# Patient Record
Sex: MALE | Race: OTHER | NOT HISPANIC OR LATINO | URBAN - METROPOLITAN AREA
[De-identification: names, ages, dates, MRNs, and addresses within clinical notes are randomized per-mention and may not be internally consistent; named-entity substitution may affect disease eponyms.]

---

## 2021-01-01 ENCOUNTER — INPATIENT (INPATIENT)
Facility: HOSPITAL | Age: 21
LOS: 2 days | Discharge: HOME | DRG: 896 | End: 2021-01-04
Attending: INTERNAL MEDICINE | Admitting: INTERNAL MEDICINE
Payer: SELF-PAY

## 2021-01-01 VITALS
DIASTOLIC BLOOD PRESSURE: 79 MMHG | RESPIRATION RATE: 18 BRPM | HEART RATE: 114 BPM | TEMPERATURE: 98 F | SYSTOLIC BLOOD PRESSURE: 126 MMHG

## 2021-01-01 DIAGNOSIS — R19.7 DIARRHEA, UNSPECIFIED: ICD-10-CM

## 2021-01-01 DIAGNOSIS — J69.0 PNEUMONITIS DUE TO INHALATION OF FOOD AND VOMIT: ICD-10-CM

## 2021-01-01 DIAGNOSIS — Z29.9 ENCOUNTER FOR PROPHYLACTIC MEASURES, UNSPECIFIED: ICD-10-CM

## 2021-01-01 DIAGNOSIS — F10.929 ALCOHOL USE, UNSPECIFIED WITH INTOXICATION, UNSPECIFIED: ICD-10-CM

## 2021-01-01 DIAGNOSIS — K92.0 HEMATEMESIS: ICD-10-CM

## 2021-01-01 DIAGNOSIS — R09.02 HYPOXEMIA: ICD-10-CM

## 2021-01-01 LAB
ACETONE SERPL-MCNC: NEGATIVE — SIGNIFICANT CHANGE UP
ALBUMIN SERPL ELPH-MCNC: 3.7 G/DL — SIGNIFICANT CHANGE UP (ref 3.5–5)
ALBUMIN SERPL ELPH-MCNC: 4.1 G/DL — SIGNIFICANT CHANGE UP (ref 3.5–5)
ALP SERPL-CCNC: 138 U/L — HIGH (ref 40–120)
ALP SERPL-CCNC: 167 U/L — HIGH (ref 40–120)
ALT FLD-CCNC: 37 U/L DA — SIGNIFICANT CHANGE UP (ref 10–60)
ALT FLD-CCNC: 42 U/L DA — SIGNIFICANT CHANGE UP (ref 10–60)
ANION GAP SERPL CALC-SCNC: 10 MMOL/L — SIGNIFICANT CHANGE UP (ref 5–17)
ANION GAP SERPL CALC-SCNC: 13 MMOL/L — SIGNIFICANT CHANGE UP (ref 5–17)
AST SERPL-CCNC: 23 U/L — SIGNIFICANT CHANGE UP (ref 10–40)
AST SERPL-CCNC: 29 U/L — SIGNIFICANT CHANGE UP (ref 10–40)
BASE EXCESS BLDA CALC-SCNC: -6.2 MMOL/L — LOW (ref -2–2)
BASOPHILS # BLD AUTO: 0.06 K/UL — SIGNIFICANT CHANGE UP (ref 0–0.2)
BASOPHILS NFR BLD AUTO: 0.4 % — SIGNIFICANT CHANGE UP (ref 0–2)
BILIRUB DIRECT SERPL-MCNC: 0.1 MG/DL — SIGNIFICANT CHANGE UP (ref 0–0.2)
BILIRUB INDIRECT FLD-MCNC: 0.2 MG/DL — SIGNIFICANT CHANGE UP (ref 0.2–1)
BILIRUB SERPL-MCNC: 0.2 MG/DL — SIGNIFICANT CHANGE UP (ref 0.2–1.2)
BILIRUB SERPL-MCNC: 0.3 MG/DL — SIGNIFICANT CHANGE UP (ref 0.2–1.2)
BLD GP AB SCN SERPL QL: SIGNIFICANT CHANGE UP
BLOOD GAS COMMENTS ARTERIAL: SIGNIFICANT CHANGE UP
BUN SERPL-MCNC: 7 MG/DL — SIGNIFICANT CHANGE UP (ref 7–18)
BUN SERPL-MCNC: 9 MG/DL — SIGNIFICANT CHANGE UP (ref 7–18)
CALCIUM SERPL-MCNC: 7.5 MG/DL — LOW (ref 8.4–10.5)
CALCIUM SERPL-MCNC: 7.7 MG/DL — LOW (ref 8.4–10.5)
CHLORIDE SERPL-SCNC: 113 MMOL/L — HIGH (ref 96–108)
CHLORIDE SERPL-SCNC: 114 MMOL/L — HIGH (ref 96–108)
CO2 SERPL-SCNC: 19 MMOL/L — LOW (ref 22–31)
CO2 SERPL-SCNC: 22 MMOL/L — SIGNIFICANT CHANGE UP (ref 22–31)
CREAT SERPL-MCNC: 0.64 MG/DL — SIGNIFICANT CHANGE UP (ref 0.5–1.3)
CREAT SERPL-MCNC: 0.77 MG/DL — SIGNIFICANT CHANGE UP (ref 0.5–1.3)
EOSINOPHIL # BLD AUTO: 0.1 K/UL — SIGNIFICANT CHANGE UP (ref 0–0.5)
EOSINOPHIL NFR BLD AUTO: 0.7 % — SIGNIFICANT CHANGE UP (ref 0–6)
ETHANOL SERPL-MCNC: 578 MG/DL — HIGH (ref 0–10)
GLUCOSE SERPL-MCNC: 118 MG/DL — HIGH (ref 70–99)
GLUCOSE SERPL-MCNC: 131 MG/DL — HIGH (ref 70–99)
HCO3 BLDA-SCNC: 21 MMOL/L — LOW (ref 23–27)
HCT VFR BLD CALC: 42.1 % — SIGNIFICANT CHANGE UP (ref 39–50)
HCT VFR BLD CALC: 48.2 % — SIGNIFICANT CHANGE UP (ref 39–50)
HGB BLD-MCNC: 14 G/DL — SIGNIFICANT CHANGE UP (ref 13–17)
HGB BLD-MCNC: 15.7 G/DL — SIGNIFICANT CHANGE UP (ref 13–17)
HOROWITZ INDEX BLDA+IHG-RTO: 21 — SIGNIFICANT CHANGE UP
IMM GRANULOCYTES NFR BLD AUTO: 0.7 % — SIGNIFICANT CHANGE UP (ref 0–1.5)
INR BLD: 1.04 RATIO — SIGNIFICANT CHANGE UP (ref 0.88–1.16)
LACTATE SERPL-SCNC: 1.7 MMOL/L — SIGNIFICANT CHANGE UP (ref 0.7–2)
LACTATE SERPL-SCNC: 2.6 MMOL/L — HIGH (ref 0.7–2)
LIDOCAIN IGE QN: 175 U/L — SIGNIFICANT CHANGE UP (ref 73–393)
LYMPHOCYTES # BLD AUTO: 1.87 K/UL — SIGNIFICANT CHANGE UP (ref 1–3.3)
LYMPHOCYTES # BLD AUTO: 13.7 % — SIGNIFICANT CHANGE UP (ref 13–44)
MAGNESIUM SERPL-MCNC: 1.9 MG/DL — SIGNIFICANT CHANGE UP (ref 1.6–2.6)
MAGNESIUM SERPL-MCNC: 2.2 MG/DL — SIGNIFICANT CHANGE UP (ref 1.6–2.6)
MCHC RBC-ENTMCNC: 29.2 PG — SIGNIFICANT CHANGE UP (ref 27–34)
MCHC RBC-ENTMCNC: 29.3 PG — SIGNIFICANT CHANGE UP (ref 27–34)
MCHC RBC-ENTMCNC: 32.6 GM/DL — SIGNIFICANT CHANGE UP (ref 32–36)
MCHC RBC-ENTMCNC: 33.3 GM/DL — SIGNIFICANT CHANGE UP (ref 32–36)
MCV RBC AUTO: 88.1 FL — SIGNIFICANT CHANGE UP (ref 80–100)
MCV RBC AUTO: 89.8 FL — SIGNIFICANT CHANGE UP (ref 80–100)
MONOCYTES # BLD AUTO: 0.25 K/UL — SIGNIFICANT CHANGE UP (ref 0–0.9)
MONOCYTES NFR BLD AUTO: 1.8 % — LOW (ref 2–14)
NEUTROPHILS # BLD AUTO: 11.29 K/UL — HIGH (ref 1.8–7.4)
NEUTROPHILS NFR BLD AUTO: 82.7 % — HIGH (ref 43–77)
NRBC # BLD: 0 /100 WBCS — SIGNIFICANT CHANGE UP (ref 0–0)
NRBC # BLD: 0 /100 WBCS — SIGNIFICANT CHANGE UP (ref 0–0)
OB PNL STL: NEGATIVE — SIGNIFICANT CHANGE UP
PCO2 BLDA: 52 MMHG — HIGH (ref 32–46)
PH BLDA: 7.24 — LOW (ref 7.35–7.45)
PHOSPHATE SERPL-MCNC: 3.2 MG/DL — SIGNIFICANT CHANGE UP (ref 2.5–4.5)
PLATELET # BLD AUTO: 296 K/UL — SIGNIFICANT CHANGE UP (ref 150–400)
PLATELET # BLD AUTO: 344 K/UL — SIGNIFICANT CHANGE UP (ref 150–400)
PO2 BLDA: 90 MMHG — SIGNIFICANT CHANGE UP (ref 74–108)
POTASSIUM SERPL-MCNC: 3.7 MMOL/L — SIGNIFICANT CHANGE UP (ref 3.5–5.3)
POTASSIUM SERPL-MCNC: 3.9 MMOL/L — SIGNIFICANT CHANGE UP (ref 3.5–5.3)
POTASSIUM SERPL-SCNC: 3.7 MMOL/L — SIGNIFICANT CHANGE UP (ref 3.5–5.3)
POTASSIUM SERPL-SCNC: 3.9 MMOL/L — SIGNIFICANT CHANGE UP (ref 3.5–5.3)
PROT SERPL-MCNC: 7.8 G/DL — SIGNIFICANT CHANGE UP (ref 6–8.3)
PROT SERPL-MCNC: 8.7 G/DL — HIGH (ref 6–8.3)
PROTHROM AB SERPL-ACNC: 12.3 SEC — SIGNIFICANT CHANGE UP (ref 10.6–13.6)
RBC # BLD: 4.78 M/UL — SIGNIFICANT CHANGE UP (ref 4.2–5.8)
RBC # BLD: 5.37 M/UL — SIGNIFICANT CHANGE UP (ref 4.2–5.8)
RBC # FLD: 12.8 % — SIGNIFICANT CHANGE UP (ref 10.3–14.5)
RBC # FLD: 12.9 % — SIGNIFICANT CHANGE UP (ref 10.3–14.5)
SAO2 % BLDA: 94 % — SIGNIFICANT CHANGE UP (ref 92–96)
SARS-COV-2 RNA SPEC QL NAA+PROBE: SIGNIFICANT CHANGE UP
SODIUM SERPL-SCNC: 145 MMOL/L — SIGNIFICANT CHANGE UP (ref 135–145)
SODIUM SERPL-SCNC: 146 MMOL/L — HIGH (ref 135–145)
WBC # BLD: 13.67 K/UL — HIGH (ref 3.8–10.5)
WBC # BLD: 16.28 K/UL — HIGH (ref 3.8–10.5)
WBC # FLD AUTO: 13.67 K/UL — HIGH (ref 3.8–10.5)
WBC # FLD AUTO: 16.28 K/UL — HIGH (ref 3.8–10.5)

## 2021-01-01 PROCEDURE — 74174 CTA ABD&PLVS W/CONTRAST: CPT | Mod: 26

## 2021-01-01 PROCEDURE — 70450 CT HEAD/BRAIN W/O DYE: CPT | Mod: 26

## 2021-01-01 PROCEDURE — 71045 X-RAY EXAM CHEST 1 VIEW: CPT | Mod: 26

## 2021-01-01 PROCEDURE — 99291 CRITICAL CARE FIRST HOUR: CPT

## 2021-01-01 RX ORDER — ONDANSETRON 8 MG/1
4 TABLET, FILM COATED ORAL ONCE
Refills: 0 | Status: COMPLETED | OUTPATIENT
Start: 2021-01-01 | End: 2021-01-01

## 2021-01-01 RX ORDER — CALCIUM GLUCONATE 100 MG/ML
1 VIAL (ML) INTRAVENOUS ONCE
Refills: 0 | Status: COMPLETED | OUTPATIENT
Start: 2021-01-01 | End: 2021-01-01

## 2021-01-01 RX ORDER — OCTREOTIDE ACETATE 200 UG/ML
50 INJECTION, SOLUTION INTRAVENOUS; SUBCUTANEOUS ONCE
Refills: 0 | Status: DISCONTINUED | OUTPATIENT
Start: 2021-01-01 | End: 2021-01-01

## 2021-01-01 RX ORDER — SODIUM CHLORIDE 9 MG/ML
1000 INJECTION, SOLUTION INTRAVENOUS ONCE
Refills: 0 | Status: COMPLETED | OUTPATIENT
Start: 2021-01-01 | End: 2021-01-01

## 2021-01-01 RX ORDER — PANTOPRAZOLE SODIUM 20 MG/1
40 TABLET, DELAYED RELEASE ORAL DAILY
Refills: 0 | Status: DISCONTINUED | OUTPATIENT
Start: 2021-01-01 | End: 2021-01-04

## 2021-01-01 RX ORDER — SODIUM CHLORIDE 9 MG/ML
1000 INJECTION INTRAMUSCULAR; INTRAVENOUS; SUBCUTANEOUS ONCE
Refills: 0 | Status: COMPLETED | OUTPATIENT
Start: 2021-01-01 | End: 2021-01-01

## 2021-01-01 RX ORDER — THIAMINE MONONITRATE (VIT B1) 100 MG
500 TABLET ORAL EVERY 8 HOURS
Refills: 0 | Status: DISCONTINUED | OUTPATIENT
Start: 2021-01-01 | End: 2021-01-04

## 2021-01-01 RX ORDER — SODIUM CHLORIDE 9 MG/ML
2400 INJECTION INTRAMUSCULAR; INTRAVENOUS; SUBCUTANEOUS ONCE
Refills: 0 | Status: COMPLETED | OUTPATIENT
Start: 2021-01-01 | End: 2021-01-01

## 2021-01-01 RX ORDER — ACETAMINOPHEN 500 MG
650 TABLET ORAL ONCE
Refills: 0 | Status: DISCONTINUED | OUTPATIENT
Start: 2021-01-01 | End: 2021-01-01

## 2021-01-01 RX ORDER — SODIUM CHLORIDE 9 MG/ML
1000 INJECTION INTRAMUSCULAR; INTRAVENOUS; SUBCUTANEOUS
Refills: 0 | Status: DISCONTINUED | OUTPATIENT
Start: 2021-01-01 | End: 2021-01-02

## 2021-01-01 RX ORDER — PIPERACILLIN AND TAZOBACTAM 4; .5 G/20ML; G/20ML
3.38 INJECTION, POWDER, LYOPHILIZED, FOR SOLUTION INTRAVENOUS ONCE
Refills: 0 | Status: COMPLETED | OUTPATIENT
Start: 2021-01-01 | End: 2021-01-01

## 2021-01-01 RX ORDER — SODIUM CHLORIDE 9 MG/ML
1000 INJECTION, SOLUTION INTRAVENOUS
Refills: 0 | Status: COMPLETED | OUTPATIENT
Start: 2021-01-01 | End: 2021-01-01

## 2021-01-01 RX ORDER — OCTREOTIDE ACETATE 200 UG/ML
50 INJECTION, SOLUTION INTRAVENOUS; SUBCUTANEOUS
Qty: 500 | Refills: 0 | Status: DISCONTINUED | OUTPATIENT
Start: 2021-01-01 | End: 2021-01-04

## 2021-01-01 RX ORDER — KETOROLAC TROMETHAMINE 30 MG/ML
30 SYRINGE (ML) INJECTION ONCE
Refills: 0 | Status: DISCONTINUED | OUTPATIENT
Start: 2021-01-01 | End: 2021-01-01

## 2021-01-01 RX ADMIN — Medication 100 GRAM(S): at 16:07

## 2021-01-01 RX ADMIN — ONDANSETRON 4 MILLIGRAM(S): 8 TABLET, FILM COATED ORAL at 16:06

## 2021-01-01 RX ADMIN — SODIUM CHLORIDE 1000 MILLILITER(S): 9 INJECTION INTRAMUSCULAR; INTRAVENOUS; SUBCUTANEOUS at 12:27

## 2021-01-01 RX ADMIN — PIPERACILLIN AND TAZOBACTAM 200 GRAM(S): 4; .5 INJECTION, POWDER, LYOPHILIZED, FOR SOLUTION INTRAVENOUS at 15:35

## 2021-01-01 RX ADMIN — ONDANSETRON 4 MILLIGRAM(S): 8 TABLET, FILM COATED ORAL at 13:52

## 2021-01-01 RX ADMIN — Medication 100 GRAM(S): at 18:46

## 2021-01-01 RX ADMIN — Medication 2 MILLIGRAM(S): at 18:53

## 2021-01-01 RX ADMIN — SODIUM CHLORIDE 100 MILLILITER(S): 9 INJECTION, SOLUTION INTRAVENOUS at 20:06

## 2021-01-01 RX ADMIN — Medication 2 MILLIGRAM(S): at 20:14

## 2021-01-01 RX ADMIN — SODIUM CHLORIDE 2000 MILLILITER(S): 9 INJECTION, SOLUTION INTRAVENOUS at 18:30

## 2021-01-01 RX ADMIN — SODIUM CHLORIDE 200 MILLILITER(S): 9 INJECTION INTRAMUSCULAR; INTRAVENOUS; SUBCUTANEOUS at 13:52

## 2021-01-01 RX ADMIN — Medication 105 MILLIGRAM(S): at 20:06

## 2021-01-01 RX ADMIN — SODIUM CHLORIDE 4800 MILLILITER(S): 9 INJECTION INTRAMUSCULAR; INTRAVENOUS; SUBCUTANEOUS at 15:31

## 2021-01-01 NOTE — H&P ADULT - HISTORY OF PRESENT ILLNESS
Young male with unknown past medical history, unknown age, unknown name was BIB EMS due to AMS. He was found unresponsive outside the building with vomitus all over. Patient was unresponsive when he was bought in to ED.     ED course:  He later was awake and had 4 episodes of coffee ground emesis in ED. His blood alcohol level was >500. He gave minimal history and said his name was Yayo King who lives with aunt and is 21 yrs old. His his history was changing and is not reliable at present.   In ED he had stool incontinence with foul smelling diarrhea. He is not giving any history of his symptoms or events that led him to this condition.

## 2021-01-01 NOTE — ED ADULT TRIAGE NOTE - CHIEF COMPLAINT QUOTE
Found covered in vomit unresponsive outside of a apartment building. Found covered in vomit unresponsive outside of an apartment building.

## 2021-01-01 NOTE — ED PROVIDER NOTE - CONSTITUTIONAL DEVELOPMENT, MLM
Internal medicine pREOPERATIVE history and physical     REQUESTING Physician    Kenny Lemus MD    PRIMARY CARE Physician    Stevenson Deleon MD    HISTORY OF PRESENT ILLNESS    This patient was seen for evaluation of medical conditions prior to upcoming surgery.  The patient is scheduled for surgery on March 12, 2018 at which time he will undergo a wide excision of a right temple lesion and reconstruction. He's had lesion for quite a while and has been getting bigger and evaluation has diagnosed as a squamous cell carcinoma. The patient feels it is a little bit irritated but denies any severe pain or any drainage.    PROBLEM LIST    Patient Active Problem List   Diagnosis   • Unspecified asthma(493.90)   • BPH (benign prostatic hypertrophy)   • Depression   • Type II diabetes mellitus, uncontrolled (CMS/HCC)   • Other and unspecified hyperlipidemia   • Mantle cell lymphoma (CMS/HCC)   • Anemia, unspecified   • Phlebitis and thrombophlebitis of other deep vessels of lower extremities       MEDICAL HISTORY    Past Medical History:   Diagnosis Date   • Anemia    • Asthma     seasonal   • Bacteremia 3/13/2013   • BPH (benign prostatic hypertrophy)    • Cellulitis of groin 3/12/2013   • Depression    • Hyperglycemia    • Kidney stones    • Lymphoma (CMS/HCC)     mantle cell lymphoma   • Other and unspecified hyperlipidemia    • Phlebitis and thrombophlebitis of other deep vessels of lower extremity, bilateral (CMS/HCC)    • Right ureteral stone 08/2014   • Thrombocytopenia, unspecified (CMS/HCC) 03/15/2013    R/T Chemo for mantle cell lymphoma (?)   • Type II or unspecified type diabetes mellitus without mention of complication, not stated as uncontrolled        FAMILY HISTORY    Family History   Problem Relation Age of Onset   • Diabetes Mother    • Diabetes Sister    • Hypertension Sister        SOCIAL HISTORY    Social History     Social History   • Marital status: Single     Spouse name: N/A   • Number of  children: 0   • Years of education: N/A     Occupational History   • / [employer]     Social History Main Topics   • Smoking status: Never Smoker   • Smokeless tobacco: Never Used   • Alcohol use No   • Drug use: No   • Sexual activity: No     Other Topics Concern   • None     Social History Narrative   • None       SURGICAL HISTORY    Past Surgical History:   Procedure Laterality Date   • Bone marrow biopsy  01/05/2013   • Cystoscopy w/ laser lithotripsy  08/05/2005   • Cystoscopy w/ ureteral stent placement  2014   • Cystoscopy,ureteroscopy,stone remv Right 08/2014   • Eye surgery Bilateral 2016   • Portacath placement  01/16/2013   • Removal gallbladder         CURRENT MEDICATIONS    Current Outpatient Prescriptions   Medication Sig Dispense Refill   • allopurinol (ZYLOPRIM) 100 MG tablet Take 1 tablet by mouth daily. 30 tablet 3   • acetaminophen (TYLENOL) 325 MG tablet Take 650 mg by mouth every 4 hours as needed for Pain.     • prochlorperazine (COMPAZINE) 10 MG tablet Take 1 tablet by mouth every 6 hours as needed for Nausea or Vomiting. 30 tablet 6   • acyclovir (ZOVIRAX) 400 MG tablet Take 1 tablet by mouth 2 times daily. 60 tablet 5   • sulfamethoxazole-trimethoprim (BACTRIM SS) 400-80 MG per tablet Take 1 tablet by mouth daily. 30 tablet 5   • lisinopril (ZESTRIL) 5 MG tablet Take 1 tablet by mouth daily. 90 tablet 1   • glipiZIDE (GLUCOTROL) 10 MG tablet Take 1 tablet by mouth 2 times daily (before meals). 180 tablet 1   • tamsulosin (FLOMAX) 0.4 MG Cap Take 1 capsule by mouth daily after a meal. 90 capsule 1   • pravastatin (PRAVACHOL) 10 MG tablet Take 1 tablet by mouth daily. 90 tablet 1   • pregabalin (LYRICA) 50 MG capsule Take 1 capsule by mouth 2 times daily. 60 capsule 5   • ketoconazole (NIZORAL) 2 % cream APPLY TWICE DAILY TO AFFECTED AREA ON SCALP 30 g 0   • furosemide (LASIX) 20 MG tablet Take 2 tablets by mouth daily. 60 tablet 0   • aspirin 81 MG tablet Take 81  mg by mouth daily.       No current facility-administered medications for this visit.        ALLERGIES    ALLERGIES:   Allergen Reactions   • No Known Allergies        REVIEW OF SYSTEMS      Constitutional:  Patient denies fever, chills.   Eyes:  Denies change in visual acuity, pain, burning or itching.   Immunologic:  Denies hives, seasonal allergies.   Head, Eyes, Ears, Nose, Throat:  Denies sinus problems, ear infections, nasal congestion, nose bleeding, gingival bleeding or sore throat.   Respiratory:  Denies cough, shortness of breath. Denies history of problems with intubation or anesthesia.  Cardiovascular:  Denies chest pain. He does have lower leg swelling which is better since he got diuretic from oncology and also since he's undergoing chemotherapy for his lymphoma which is showing some of his groin adenopathy.  Gastrointestinal:  Denies abdominal pain, nausea, vomiting, bloody or dark stools, diarrhea.   Genitourinary:  Denies urine retention, painful urination,  blood in urine or nocturia.    Integument:  Denies rash, itching.   Neurologic:  Denies headache, focal weakness or sensory changes.   Endocrine:  Denies polyuria, polydipsia or temperature intolerance.   Lymphatic:  Denies swollen glands, weight loss.   All other systems reviewed and negative.     PHYSICAL EXAMINATION      VITAL SIGNS:    Visit Vitals  /58 (BP [facility] [place], Patient Position: Sitting, Cuff Size: Regular)   Pulse 88   Resp 14   Ht 5' 6\" (1.676 m)   Wt 83.5 kg   SpO2 99%   BMI 29.70 kg/m²      Constitutional:  No acute distress.   HENT:  Normocephalic. Atraumatic. Bilateral external ears normal. Oropharynx moist. No oral exudates. No tonsillar or uvular edema. Nose normal.   Neck: Normal range of motion. No tenderness. Supple. No stridor.    Eyes:  PERRL, EOMI (Pupils equal, round, reactive to light, extraocular movements intact). Conjunctivae normal. No discharge.    Cardiovascular:  Normal rate. Normal rhythm. No murmurs,  gallops, or rubs.  Has 1-2+ bilateral lower leg edema.  Respiratory:  No respiratory distress. Normal breath sounds. No rales. No wheezing.    Gastrointestinal:  Bowel sounds normal. Soft. No tenderness. Bilateral inguinal adenopathy noted. No pulsatile masses.    Neurologic:  Alert and oriented times 3. Normal motor function.  No focal deficits noted.        LABS  Lab Services on 03/01/2018   Component Date Value Ref Range Status   • WBC 03/01/2018 6.1  4.2 - 11.0 K/mcL Final   • RBC 03/01/2018 3.18* 4.50 - 5.90 mil/mcL Final   • HGB 03/01/2018 9.7* 13.0 - 17.0 g/dL Final   • HCT 03/01/2018 28.9* 39.0 - 51.0 % Final   • MCV 03/01/2018 90.9  78.0 - 100.0 fl Final   • MCH 03/01/2018 30.5  26.0 - 34.0 pg Final   • MCHC 03/01/2018 33.6  32.0 - 36.5 g/dL Final   • RDW-CV 03/01/2018 15.8* 11.0 - 15.0 % Final   • PLT 03/01/2018 88* 140 - 450 K/mcL Final   • DIFF TYPE 03/01/2018 AUTOMATED DIFFERENTIAL   Final   • Neutrophil 03/01/2018 69  % Final   • LYMPH 03/01/2018 19  % Final   • MONO 03/01/2018 9  % Final   • EOSIN 03/01/2018 3  % Final   • BASO 03/01/2018 0  % Final   • Absolute Neutrophil 03/01/2018 4.2  1.8 - 7.7 K/mcL Final   • Absolute Lymph 03/01/2018 1.2  1.0 - 4.0 K/mcL Final   • Absolute Mono 03/01/2018 0.5  0.3 - 0.9 K/mcL Final   • Absolute Eos 03/01/2018 0.2  0.1 - 0.5 K/mcL Final   • Absolute Baso 03/01/2018 0.0  0.0 - 0.3 K/mcL Final   • Fasting Status 03/01/2018 UNK  hrs Final   • Sodium 03/01/2018 139  135 - 145 mmol/L Final   • Potassium 03/01/2018 4.6  3.4 - 5.1 mmol/L Final   • Chloride 03/01/2018 105  98 - 107 mmol/L Final   • Carbon Dioxide 03/01/2018 23  21 - 32 mmol/L Final   • Anion Gap 03/01/2018 16  10 - 20 mmol/L Final   • Glucose 03/01/2018 276* 65 - 99 mg/dL Final   • BUN 03/01/2018 41* 6 - 20 mg/dL Final   • Creatinine 03/01/2018 1.22* 0.67 - 1.17 mg/dL Final   • GFR Estimate,  03/01/2018 67   Final   • GFR Estimate, Non  03/01/2018 58   Final   •  BUN/Creatinine Ratio 03/01/2018 34* 7 - 25 Final   • CALCIUM 03/01/2018 8.7  8.4 - 10.2 mg/dL Final   • TOTAL BILIRUBIN 03/01/2018 0.4  0.2 - 1.0 mg/dL Final   • AST/SGOT 03/01/2018 25  <38 Units/L Final   • ALT/SGPT 03/01/2018 20  <79 Units/L Final   • ALK PHOSPHATASE 03/01/2018 46  45 - 117 Units/L Final   • TOTAL PROTEIN 03/01/2018 6.2* 6.4 - 8.2 g/dL Final   • Albumin 03/01/2018 2.9* 3.6 - 5.1 g/dL Final   • GLOBULIN 03/01/2018 3.3  2.0 - 4.0 g/dL Final   • A/G Ratio, Serum 03/01/2018 0.9* 1.0 - 2.4 Final   Lab Services on 02/22/2018   Component Date Value Ref Range Status   • WBC 02/22/2018 7.2  4.2 - 11.0 K/mcL Final   • RBC 02/22/2018 3.31* 4.50 - 5.90 mil/mcL Final   • HGB 02/22/2018 9.9* 13.0 - 17.0 g/dL Final   • HCT 02/22/2018 29.7* 39.0 - 51.0 % Final   • MCV 02/22/2018 89.7  78.0 - 100.0 fl Final   • MCH 02/22/2018 29.9  26.0 - 34.0 pg Final   • MCHC 02/22/2018 33.3  32.0 - 36.5 g/dL Final   • RDW-CV 02/22/2018 16.0* 11.0 - 15.0 % Final   • PLT 02/22/2018 99* 140 - 450 K/mcL Final   • DIFF TYPE 02/22/2018 AUTOMATED DIFFERENTIAL   Final   • Neutrophil 02/22/2018 82  % Final   • LYMPH 02/22/2018 13  % Final   • MONO 02/22/2018 3  % Final   • EOSIN 02/22/2018 2  % Final   • BASO 02/22/2018 0  % Final   • Absolute Neutrophil 02/22/2018 6.0  1.8 - 7.7 K/mcL Final   • Absolute Lymph 02/22/2018 0.9* 1.0 - 4.0 K/mcL Final   • Absolute Mono 02/22/2018 0.2* 0.3 - 0.9 K/mcL Final   • Absolute Eos 02/22/2018 0.1  0.1 - 0.5 K/mcL Final   • Absolute Baso 02/22/2018 0.0  0.0 - 0.3 K/mcL Final   • Fasting Status 02/22/2018 UNK  hrs Final   • Sodium 02/22/2018 140  135 - 145 mmol/L Final   • Potassium 02/22/2018 4.4  3.4 - 5.1 mmol/L Final   • Chloride 02/22/2018 107  98 - 107 mmol/L Final   • Carbon Dioxide 02/22/2018 21  21 - 32 mmol/L Final   • Anion Gap 02/22/2018 16  10 - 20 mmol/L Final   • Glucose 02/22/2018 229* 65 - 99 mg/dL Final   • BUN 02/22/2018 42* 6 - 20 mg/dL Final   • Creatinine 02/22/2018 1.27* 0.67 -  1.17 mg/dL Final   • GFR Estimate,  02/22/2018 64   Final   • GFR Estimate, Non  02/22/2018 55   Final   • BUN/Creatinine Ratio 02/22/2018 33* 7 - 25 Final   • CALCIUM 02/22/2018 8.6  8.4 - 10.2 mg/dL Final   • TOTAL BILIRUBIN 02/22/2018 0.5  0.2 - 1.0 mg/dL Final   • AST/SGOT 02/22/2018 30  <38 Units/L Final   • ALT/SGPT 02/22/2018 32  <79 Units/L Final   • ALK PHOSPHATASE 02/22/2018 45  45 - 117 Units/L Final   • TOTAL PROTEIN 02/22/2018 6.2* 6.4 - 8.2 g/dL Final   • Albumin 02/22/2018 2.9* 3.6 - 5.1 g/dL Final   • GLOBULIN 02/22/2018 3.3  2.0 - 4.0 g/dL Final   • A/G Ratio, Serum 02/22/2018 0.9* 1.0 - 2.4 Final   • URIC ACID 02/22/2018 6.3  3.5 - 7.2 mg/dL Final   • LDH 02/22/2018 174  86 - 234 Units/L Final   Hospital Outpatient Visit on 02/19/2018   Component Date Value Ref Range Status   • Left Ventricular Internal Dimensio* 02/19/2018 4.0  cm Final   • Left Internal Dimenson in Systole 02/19/2018 2.5  cm Final   • Ejection Fraction 02/19/2018 63.0  % Final   • Interventricular Septum in End Trini* 02/19/2018 0.9  cm Final   • Left Ventricular Posterior Wall in* 02/19/2018 0.9  cm Final   • LV End Systolic Longitudinal Strai* 02/19/2018 -19.7  % Final   • LVOT 2D 02/19/2018 2.0  cm Final   • Ascending Aorta 02/19/2018 3.2  cm Final   • AV Peak Velocity 02/19/2018 1.3  m/s Final   • AV Peak Gradient 02/19/2018 6.5  mmHg Final   • AV Mean Gradient 02/19/2018 3.3  mmHg Final   • MV Peak E Velocity 02/19/2018 0.8  m/s Final   • MV Peak A Velocity 02/19/2018 0.7  m/s Final   • DOP Calc LVOT Peak Tera 02/19/2018 0.7  m/s Final   • E Wave Decelaration Time 02/19/2018 206.6  ms Final   • LVOT VTI 02/19/2018 14.3  cm Final   • MV E Tissue Tera Lat 02/19/2018 6.6  cm/s Final   • MV E Tissue Tera Med 02/19/2018 5.5  cm/s Final   • MV E Wave Tera/E Tissue Tera Med 02/19/2018 15.2   Final   • Aortic Valve Area 02/19/2018 1.7  cm2 Final   • Tricuspid Valve Peak Regurgitation* 02/19/2018 2.5  m/s  Final   • TV Estimated Right Arterial Pressu* 02/19/2018 3.0  mmHg Final   • Est Right Vent Systolic Pressure b* 02/19/2018 28.0  mmHg Final         EKG      Results for orders placed or performed in visit on 02/06/18   ELECTROCARDIOGRAM 12-LEAD   Result Value Ref Range    Ventricular Rate EKG/Min (BPM) 78     Atrial Rate (BPM) 78     TN-Interval (MSEC) 182     QRS-Interval (MSEC) 84     QT-Interval (MSEC) 392     QTc 446     P Axis (Degrees) 63     R Axis (Degrees) 71     T Axis (Degrees) 64     REPORT TEXT       Normal sinus rhythm  Normal ECG  Confirmed by GINA VAUGHAN MD ([id number]) on 2/6/2018 4:41:50 PM           Assessment/plan:     75 year old male with planned surgery as above.    1. 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  The patient's medical conditions are currently stable. He will avoid aspirin and NSAIDs one week prior to procedure.  2. Type 2 diabetes mellitus-will hold his Glucotrol the day of his procedure.  3. Chronic kidney disease-stage III-stable  4. Mantle cell lymphoma for which she is undergoing chemotherapy currently with oncology-has anemia and thrombocytopenia due to this. Current platelets are at 88,000.      Thank you for allowing me to participate in the care of this patient.    A copy of this consultation will be sent to the referring physician, Kenny Pelayo MD               well developed

## 2021-01-01 NOTE — H&P ADULT - PROBLEM SELECTOR PLAN 4
Had 2 episodes of watery diarrhea which were foul smelling  If has more episodes will send for cdiff and other stool studies  IV hydration for now

## 2021-01-01 NOTE — H&P ADULT - PROBLEM SELECTOR PLAN 3
Had multiple episodes of coffee ground emesis  Likely secondary to alcoholism, gastritis vs varices vs ulcer  NPO for now  Was started on octreotide in ED  H/H is stable  Will repeat CBC, if stable can do cbc q12  Will consult GI Dr Perez  Aspiration precautions  f/u CTA abdomen

## 2021-01-01 NOTE — ED PROVIDER NOTE - OBJECTIVE STATEMENT
35 y.o. male BIBA reportedly pt was found unresponsive outside a building, with vomitus all over.  Unable to obtain further information from pt.

## 2021-01-01 NOTE — H&P ADULT - PROBLEM SELECTOR PLAN 2
Presented with vomiting  CXR showed ?infiltrates  Was given one dose of zosyn  Will start on levaquin  D/c antibiotics if patient does not develop fever or stop after 3 days  Aspiration precautions

## 2021-01-01 NOTE — ED ADULT NURSE NOTE - ED STAT RN HANDOFF DETAILS
Pt endorsed to Lorna AGEE in stable condition. VSS. no further vomiting or diarrhea, Pt guarded with information about identity

## 2021-01-01 NOTE — ED ADULT NURSE NOTE - NS ED NOTE ABUSE RESPONSE YN
UA ordered.   If positive, do not necessarily need to see her if she wants to come in earlier to do test.   Unable to assess due to medical condition

## 2021-01-01 NOTE — H&P ADULT - NSHPPHYSICALEXAM_GEN_ALL_CORE
Vital Signs (24 Hrs):  T(C): 36.6 (01-01-21 @ 15:29), Max: 36.6 (01-01-21 @ 15:29)  HR: 106 (01-01-21 @ 15:29) (106 - 122)  BP: 108/65 (01-01-21 @ 15:29) (108/65 - 126/79)  RR: 20 (01-01-21 @ 15:29) (18 - 22)  SpO2: 100% (01-01-21 @ 15:29) (89% - 100%)

## 2021-01-01 NOTE — ED PROVIDER NOTE - CARE PLAN
Principal Discharge DX:	Hypoxemia  Secondary Diagnosis:	Alcohol intoxication  Secondary Diagnosis:	Vomiting  Secondary Diagnosis:	Aspiration pneumonia   Principal Discharge DX:	Hypoxemia  Secondary Diagnosis:	Alcohol intoxication  Secondary Diagnosis:	Vomiting  Secondary Diagnosis:	Aspiration pneumonia  Secondary Diagnosis:	Hypocalcemia

## 2021-01-01 NOTE — H&P ADULT - ASSESSMENT
A young male with unknown past medical history comes in with AMS secondary to alcoholism.    ED:  Blood alcohol level- 578  Ct head- negative  Had multiple episodes of coffee ground emesis.  EKG showed sinus tachycardia    Patient is being admitted to medicine for alcohol intoxication and upper GI bleed.

## 2021-01-01 NOTE — H&P ADULT - PROBLEM SELECTOR PLAN 1
Presented with encephalopathy secondary to alcohol intoxication  Blood alcohol level 578  Also had multiple episodes of vomiting containing coffee ground material  NPO for now  Started on CIWA protocol  Ativan 2q2 standing, 2q3 PRN  f/u utox  Monitor CIWA  aspiration precautions  Seizure precautions  Social consult

## 2021-01-01 NOTE — ED ADULT NURSE NOTE - ED STAT RN HANDOFF DETAILS 3
Patient admitted to telemetry continued tachycardic stable in no acute distress all meds administered as ordered. Patient assigned to 5 N report given to Sheri Bates. Patient to be transported via stretcher with cardiac monitor by Rn and transported safety maintained.

## 2021-01-01 NOTE — ED ADULT NURSE NOTE - NSIMPLEMENTINTERV_GEN_ALL_ED
Implemented All Fall with Harm Risk Interventions:  [place] to call system. Call bell, personal items and telephone within reach. Instruct patient to call for assistance. Room bathroom lighting operational. Non-slip footwear when patient is off stretcher. Physically safe environment: no spills, clutter or unnecessary equipment. Stretcher in lowest position, wheels locked, appropriate side rails in place. Provide visual cue, wrist band, yellow gown, etc. Monitor gait and stability. Monitor for mental status changes and reorient to person, place, and time. Review medications for side effects contributing to fall risk. Reinforce activity limits and safety measures with patient and family. Provide visual clues: red socks.

## 2021-01-01 NOTE — ED PROVIDER NOTE - PROGRESS NOTE DETAILS
Pt continues to be unresponsive, ETOH level-578, pt vomited few times with coffee ground vomitus, also strong alcohol odor.  Pt with concern for aspiration PNA.  Case d/w Dr. Gage, will admit pt is no awake, with no resp. distress, will admit pt pt is no awake, with no resp. distress, will admit pt to medicine

## 2021-01-02 LAB
A1C WITH ESTIMATED AVERAGE GLUCOSE RESULT: 5.4 % — SIGNIFICANT CHANGE UP (ref 4–5.6)
ALBUMIN SERPL ELPH-MCNC: 3.7 G/DL — SIGNIFICANT CHANGE UP (ref 3.5–5)
ALP SERPL-CCNC: 117 U/L — SIGNIFICANT CHANGE UP (ref 40–120)
ALT FLD-CCNC: 34 U/L DA — SIGNIFICANT CHANGE UP (ref 10–60)
ANION GAP SERPL CALC-SCNC: 9 MMOL/L — SIGNIFICANT CHANGE UP (ref 5–17)
AST SERPL-CCNC: 15 U/L — SIGNIFICANT CHANGE UP (ref 10–40)
BASOPHILS # BLD AUTO: 0.03 K/UL — SIGNIFICANT CHANGE UP (ref 0–0.2)
BASOPHILS NFR BLD AUTO: 0.3 % — SIGNIFICANT CHANGE UP (ref 0–2)
BILIRUB SERPL-MCNC: 0.6 MG/DL — SIGNIFICANT CHANGE UP (ref 0.2–1.2)
BUN SERPL-MCNC: 6 MG/DL — LOW (ref 7–18)
CALCIUM SERPL-MCNC: 8.3 MG/DL — LOW (ref 8.4–10.5)
CHLORIDE SERPL-SCNC: 108 MMOL/L — SIGNIFICANT CHANGE UP (ref 96–108)
CHOLEST SERPL-MCNC: 176 MG/DL — SIGNIFICANT CHANGE UP
CO2 SERPL-SCNC: 25 MMOL/L — SIGNIFICANT CHANGE UP (ref 22–31)
CREAT SERPL-MCNC: 0.74 MG/DL — SIGNIFICANT CHANGE UP (ref 0.5–1.3)
EOSINOPHIL # BLD AUTO: 0.04 K/UL — SIGNIFICANT CHANGE UP (ref 0–0.5)
EOSINOPHIL NFR BLD AUTO: 0.3 % — SIGNIFICANT CHANGE UP (ref 0–6)
ESTIMATED AVERAGE GLUCOSE: 108 MG/DL — SIGNIFICANT CHANGE UP (ref 68–114)
FOLATE SERPL-MCNC: 8.6 NG/ML — SIGNIFICANT CHANGE UP
GLUCOSE BLDC GLUCOMTR-MCNC: 116 MG/DL — HIGH (ref 70–99)
GLUCOSE BLDC GLUCOMTR-MCNC: 137 MG/DL — HIGH (ref 70–99)
GLUCOSE BLDC GLUCOMTR-MCNC: 87 MG/DL — SIGNIFICANT CHANGE UP (ref 70–99)
GLUCOSE SERPL-MCNC: 84 MG/DL — SIGNIFICANT CHANGE UP (ref 70–99)
HCT VFR BLD CALC: 43.9 % — SIGNIFICANT CHANGE UP (ref 39–50)
HDLC SERPL-MCNC: 51 MG/DL — SIGNIFICANT CHANGE UP
HGB BLD-MCNC: 14.6 G/DL — SIGNIFICANT CHANGE UP (ref 13–17)
IMM GRANULOCYTES NFR BLD AUTO: 0.4 % — SIGNIFICANT CHANGE UP (ref 0–1.5)
INR BLD: 1.14 RATIO — SIGNIFICANT CHANGE UP (ref 0.88–1.16)
LIPID PNL WITH DIRECT LDL SERPL: 101 MG/DL — HIGH
LYMPHOCYTES # BLD AUTO: 1.89 K/UL — SIGNIFICANT CHANGE UP (ref 1–3.3)
LYMPHOCYTES # BLD AUTO: 16.2 % — SIGNIFICANT CHANGE UP (ref 13–44)
MAGNESIUM SERPL-MCNC: 1.6 MG/DL — SIGNIFICANT CHANGE UP (ref 1.6–2.6)
MCHC RBC-ENTMCNC: 29.4 PG — SIGNIFICANT CHANGE UP (ref 27–34)
MCHC RBC-ENTMCNC: 33.3 GM/DL — SIGNIFICANT CHANGE UP (ref 32–36)
MCV RBC AUTO: 88.5 FL — SIGNIFICANT CHANGE UP (ref 80–100)
MONOCYTES # BLD AUTO: 0.68 K/UL — SIGNIFICANT CHANGE UP (ref 0–0.9)
MONOCYTES NFR BLD AUTO: 5.8 % — SIGNIFICANT CHANGE UP (ref 2–14)
NEUTROPHILS # BLD AUTO: 8.97 K/UL — HIGH (ref 1.8–7.4)
NEUTROPHILS NFR BLD AUTO: 77 % — SIGNIFICANT CHANGE UP (ref 43–77)
NON HDL CHOLESTEROL: 125 MG/DL — SIGNIFICANT CHANGE UP
NRBC # BLD: 0 /100 WBCS — SIGNIFICANT CHANGE UP (ref 0–0)
PHOSPHATE SERPL-MCNC: 4.2 MG/DL — SIGNIFICANT CHANGE UP (ref 2.5–4.5)
PLATELET # BLD AUTO: 294 K/UL — SIGNIFICANT CHANGE UP (ref 150–400)
POTASSIUM SERPL-MCNC: 3.7 MMOL/L — SIGNIFICANT CHANGE UP (ref 3.5–5.3)
POTASSIUM SERPL-SCNC: 3.7 MMOL/L — SIGNIFICANT CHANGE UP (ref 3.5–5.3)
PROT SERPL-MCNC: 7.6 G/DL — SIGNIFICANT CHANGE UP (ref 6–8.3)
PROTHROM AB SERPL-ACNC: 13.5 SEC — SIGNIFICANT CHANGE UP (ref 10.6–13.6)
RBC # BLD: 4.96 M/UL — SIGNIFICANT CHANGE UP (ref 4.2–5.8)
RBC # FLD: 12.9 % — SIGNIFICANT CHANGE UP (ref 10.3–14.5)
SODIUM SERPL-SCNC: 142 MMOL/L — SIGNIFICANT CHANGE UP (ref 135–145)
TRIGL SERPL-MCNC: 119 MG/DL — SIGNIFICANT CHANGE UP
TSH SERPL-MCNC: 0.93 UU/ML — SIGNIFICANT CHANGE UP (ref 0.34–4.82)
VIT B12 SERPL-MCNC: 327 PG/ML — SIGNIFICANT CHANGE UP (ref 232–1245)
WBC # BLD: 11.66 K/UL — HIGH (ref 3.8–10.5)
WBC # FLD AUTO: 11.66 K/UL — HIGH (ref 3.8–10.5)

## 2021-01-02 RX ORDER — INFLUENZA VIRUS VACCINE 15; 15; 15; 15 UG/.5ML; UG/.5ML; UG/.5ML; UG/.5ML
0.5 SUSPENSION INTRAMUSCULAR ONCE
Refills: 0 | Status: COMPLETED | OUTPATIENT
Start: 2021-01-02 | End: 2021-01-02

## 2021-01-02 RX ORDER — SODIUM CHLORIDE 9 MG/ML
1000 INJECTION, SOLUTION INTRAVENOUS
Refills: 0 | Status: DISCONTINUED | OUTPATIENT
Start: 2021-01-02 | End: 2021-01-04

## 2021-01-02 RX ADMIN — SODIUM CHLORIDE 200 MILLILITER(S): 9 INJECTION INTRAMUSCULAR; INTRAVENOUS; SUBCUTANEOUS at 04:53

## 2021-01-02 RX ADMIN — OCTREOTIDE ACETATE 10 MICROGRAM(S)/HR: 200 INJECTION, SOLUTION INTRAVENOUS; SUBCUTANEOUS at 08:29

## 2021-01-02 RX ADMIN — Medication 2 MILLIGRAM(S): at 10:59

## 2021-01-02 RX ADMIN — Medication 2 MILLIGRAM(S): at 17:26

## 2021-01-02 RX ADMIN — PANTOPRAZOLE SODIUM 40 MILLIGRAM(S): 20 TABLET, DELAYED RELEASE ORAL at 14:27

## 2021-01-02 RX ADMIN — Medication 2 MILLIGRAM(S): at 06:17

## 2021-01-02 RX ADMIN — SODIUM CHLORIDE 100 MILLILITER(S): 9 INJECTION, SOLUTION INTRAVENOUS at 06:18

## 2021-01-02 RX ADMIN — Medication 2 MILLIGRAM(S): at 14:27

## 2021-01-02 RX ADMIN — Medication 105 MILLIGRAM(S): at 06:17

## 2021-01-02 RX ADMIN — Medication 105 MILLIGRAM(S): at 14:23

## 2021-01-02 RX ADMIN — Medication 2 MILLIGRAM(S): at 22:24

## 2021-01-02 RX ADMIN — Medication 105 MILLIGRAM(S): at 22:18

## 2021-01-02 NOTE — PROGRESS NOTE ADULT - PROBLEM SELECTOR PLAN 1
Presented with encephalopathy secondary to alcohol intoxication  Blood alcohol level 578  Also had multiple episodes of vomiting containing coffee ground material  NPO for now  Started on CIWA protocol  Ativan 2q2 standing --> change to 2mg q4 (cont CIWA as backup)  f/u utox  Monitor CIWA  aspiration precautions  Seizure precautions  Social consult Presented with encephalopathy secondary to alcohol intoxication  Blood alcohol level 578  Also had multiple episodes of vomiting containing coffee ground material  NPO for now  Cont on IV supplements (thiamine + MV)  Started on CIWA protocol  Ativan 2q2 standing --> change to 2mg q4 (cont CIWA as backup)  f/u utox  Monitor CIWA  aspiration precautions  Seizure precautions  Social consult

## 2021-01-02 NOTE — CONSULT NOTE ADULT - NEGATIVE GASTROINTESTINAL SYMPTOMS
no nausea/no constipation/no abdominal pain/no melena/no hematochezia/no steatorrhea/no jaundice/no hiccoughs

## 2021-01-02 NOTE — PROGRESS NOTE ADULT - SUBJECTIVE AND OBJECTIVE BOX
ANNA COTTON  MR# [id number]  20yMale        Patient is a 20y old  Male who presents with a chief complaint of AMS (02 Jan 2021 14:03)      INTERVAL HPI/OVERNIGHT EVENTS:  Patient seen and examined at bedside. No notations of chest pain, palpitation, SOB, orthopnea, nausea, vomiting or abdominal pain.    ALLERGIES  Allergy Status Unknown      MEDICATIONS  dextrose 5% + sodium chloride 0.9%. 1000 milliLiter(s) IV Continuous <Continuous>  influenza   Vaccine 0.5 milliLiter(s) IntraMuscular once  levoFLOXacin IVPB 500 milliGRAM(s) IV Intermittent every 24 hours  LORazepam   Injectable 2 milliGRAM(s) IV Push every 2 hours PRN Agitation  LORazepam   Injectable 2 milliGRAM(s) IV Push every 4 hours  octreotide  Infusion 50 MICROgram(s)/Hr IV Continuous <Continuous>  pantoprazole  Injectable 40 milliGRAM(s) IV Push daily  thiamine IVPB 500 milliGRAM(s) IV Intermittent every 8 hours              REVIEW OF SYSTEMS:  CONSTITUTIONAL: No fever, weight loss, or fatigue  EYES: No eye pain, visual disturbances, or discharge  ENT:  No difficulty hearing, tinnitus, vertigo; No sinus or throat pain  NECK: No pain or stiffness  RESPIRATORY: No cough, wheezing, chills or hemoptysis; No Shortness of Breath  CARDIOVASCULAR: No chest pain, palpitations, passing out, dizziness, or leg swelling  GASTROINTESTINAL: No abdominal or epigastric pain. No nausea, vomiting, or hematemesis; No diarrhea or constipation. No melena or hematochezia.  GENITOURINARY: No dysuria, frequency, hematuria, or incontinence  NEUROLOGICAL: No headaches, memory loss, loss of strength, numbness, or tremors  SKIN: No itching, burning, rashes, or lesions   LYMPH Nodes: No enlarged glands  ENDOCRINE: No heat or cold intolerance; No hair loss  MUSCULOSKELETAL: No joint pain or swelling; No muscle, back, or extremity pain  PSYCHIATRIC: No depression, anxiety, mood swings, or difficulty sleeping  HEME/LYMPH: No easy bruising, or bleeding gums  ALLERGY AND IMMUNOLOGIC: No hives or eczema	    [ ] All others negative	  [ ] Unable to obtain      T(C): 37.3 (01-02-21 @ 15:20), Max: 37.6 (01-02-21 @ 10:20)  T(F): 99.2 (01-02-21 @ 15:20), Max: 99.6 (01-02-21 @ 10:20)  HR: 113 (01-02-21 @ 15:20) (91 - 118)  BP: 141/74 (01-02-21 @ 15:20) (105/55 - 141/74)  RR: 18 (01-02-21 @ 15:20) (16 - 19)  SpO2: 98% (01-02-21 @ 15:20) (91% - 100%)  Wt(kg): --    I&O's Summary        PHYSICAL EXAM:  A X O x  HEAD:  Atraumatic, Normocephalic  EYES: EOMI, PERRLA, conjunctiva and sclera clear  NECK: Supple, No JVD, Normal thyroid  Resp: CTAB, No crackles, wheezing,   CVS: Regular rate and rhythm; No discernable murmurs, rubs, or gallops  ABD: Soft, Nontender, Nondistended; Bowel sounds present  EXTREMITIES:  2+ Peripheral Pulses, No edema  LYMPH: No dicernable lymphadenopathy noted  GENERAL: NAD, well-groomed, well-developed      LABS:                        14.6   11.66 )-----------( 294      ( 02 Jan 2021 05:48 )             43.9     01-02    142  |  108  |  6<L>  ----------------------------<  84  3.7   |  25  |  0.74    Ca    8.3<L>      02 Jan 2021 05:48  Phos  4.2     01-02  Mg     1.6     01-02    TPro  7.6  /  Alb  3.7  /  TBili  0.6  /  DBili  x   /  AST  15  /  ALT  34  /  AlkPhos  117  01-02    PT/INR - ( 02 Jan 2021 05:48 )   PT: 13.5 sec;   INR: 1.14 ratio             CAPILLARY BLOOD GLUCOSE      POCT Blood Glucose.: 137 mg/dL (02 Jan 2021 11:52)  POCT Blood Glucose.: 87 mg/dL (02 Jan 2021 03:45)      Troponins:  ProBNP:  Lipid Profile:   HgA1c:  TSH:     ABG - ( 01 Jan 2021 15:21 )  pH, Arterial: 7.24  pH, Blood: x     /  pCO2: 52    /  pO2: 90    / HCO3: 21    / Base Excess: -6.2  /  SaO2: 94                    RADIOLOGY & ADDITIONAL TESTS:    Imaging Personally Reviewed:  [ ] YES  [ ] NO      Consultant(s) Notes Reviewed:  [x ] YES  [ ] NO    Care Discussed with Consultants/Other Providers [ x] YES  [ ] NO          PAST MEDICAL & SURGICAL HISTORY:  No pertinent past medical history          Hypoxemia    Handoff    MEWS Score    No pertinent past medical history    Hypoxemia    Prophylactic measure    Diarrhea    Coffee ground emesis    Aspiration pneumonia    Alcohol intoxication    A) AMS    Hypocalcemia    Aspiration pneumonia    Vomiting    Alcohol intoxication    [email or web address]

## 2021-01-02 NOTE — ED ADULT NURSE REASSESSMENT NOTE - NS ED NURSE REASSESS COMMENT FT1
pt awake,not in dsitress,hooked to cardiac monitor,with saline lock intact,no redness,no swelling noted.
pt is sleeping on the tele monitor - pt is appears to be sleeping in a yellow gown
received pt sleeping ,not in distress,hookedt o cardiac monitor,with saline lock intact,no redness,no swelling noted,waiitng for bed.
Pt guiac negative, octreotide held

## 2021-01-02 NOTE — CONSULT NOTE ADULT - NEGATIVE ENMT SYMPTOMS
no hearing difficulty/no ear pain/no tinnitus/no vertigo/no sinus symptoms/no nasal congestion/no nasal discharge/no nasal obstruction/no gum bleeding/no dry mouth/no throat pain/no dysphagia

## 2021-01-02 NOTE — CONSULT NOTE ADULT - NEGATIVE MUSCULOSKELETAL SYMPTOMS
no muscle cramps/no stiffness/no neck pain/no arm pain L/no arm pain R/no back pain/no leg pain L/no leg pain R

## 2021-01-02 NOTE — CONSULT NOTE ADULT - ASSESSMENT
The etiology for coffee ground emesis in this patient is most likely due to:  1. Peptic ulcer disease  2. Alcoholic gastritis  3. Maia Agustin tear  4. No evidence of acute GI bleeding at present time    Suggestions:    1. Monitor H/H  2. Transfuse PRBC as needed  3. Protonix 40mg daily  4. Avoid NSAID  5. DT's precaution  6. DVT prophylaxis

## 2021-01-02 NOTE — CONSULT NOTE ADULT - SUBJECTIVE AND OBJECTIVE BOX
[  ] STAT REQUEST              [X ] ROUTINE REQUEST    Patient is a 20 year old male with coffee ground emesis. GI consulted to evaluate.         HPI:  20 year old male with out significant past medical history admitted with altered mental status. In Emergency room he had 4 episodes of coffee ground emesis. His blood alcohol level was >500. No abdominal pain, melena, hematochezia, epistaxis, hemoptysis, fever, chills, chest pain, SOB, cough, hematuria, dysuria or diarrhea reported.       PAIN MANAGEMENT:  Pain Scale:                0 /10  Pain Location:      Prior Colonoscopy:  No prior colonoscopy    PAST MEDICAL HISTORY  No pertinent past medical history      PAST SURGICAL HISTORY  No significant surgical history reported      Allergies    Allergy Status Unknown    Intolerances  None         MEDICATIONS  (STANDING):  dextrose 5% + sodium chloride 0.9%. 1000 milliLiter(s) (100 mL/Hr) IV Continuous <Continuous>  levoFLOXacin IVPB 500 milliGRAM(s) IV Intermittent every 24 hours  LORazepam   Injectable 2 milliGRAM(s) IV Push every 4 hours  octreotide  Infusion 50 MICROgram(s)/Hr (10 mL/Hr) IV Continuous <Continuous>  pantoprazole  Injectable 40 milliGRAM(s) IV Push daily  thiamine IVPB 500 milliGRAM(s) IV Intermittent every 8 hours    MEDICATIONS  (PRN):  LORazepam   Injectable 2 milliGRAM(s) IV Push every 2 hours PRN Agitation      SOCIAL HISTORY  Advanced Directives:       [ X] Full Code       [  ] DNR  Marital Status:         [  ] M      [ X ] S      [  ] D       [  ] W  Children:       [  ] Yes      [ X ] No  Occupation:        [  ] Employed       [ X ] Unemployed       [  ] Retired  Diet:       [ X Regular       [  ] PEG feeding          [  ] NG tube feeding  Drug Use:           [ X ] Patient denied          [  ] Yes  Alcohol:           [  ] No             [  ] Yes (socially)         [ X ] Yes (chronic)  Tobacco:           [  ] Yes           [ X ] No      FAMILY HISTORY  [ X ] Heart Disease            [ X ] Diabetes             [ X ] HTN             [  ] Colon Cancer             [  ] Stomach Cancer              [  ] Pancreatic Cancer      VITAL SIGNS   Vital Signs Last 24 Hrs  T(C): 37.6 (02 Jan 2021 10:20), Max: 37.6 (02 Jan 2021 10:20)  T(F): 99.6 (02 Jan 2021 10:20), Max: 99.6 (02 Jan 2021 10:20)  HR: 118 (02 Jan 2021 10:20) (91 - 121)  BP: 138/77 (02 Jan 2021 10:20) (105/55 - 138/77)   RR: 19 (02 Jan 2021 10:20) (16 - 20)  SpO2: 95% (02 Jan 2021 10:20) (91% - 100%)      CBC Full  -  ( 02 Jan 2021 05:48 )  WBC Count : 11.66 K/uL  RBC Count : 4.96 M/uL  Hemoglobin : 14.6 g/dL  Hematocrit : 43.9 %  Platelet Count - Automated : 294 K/uL  Mean Cell Volume : 88.5 fl  Mean Cell Hemoglobin : 29.4 pg  Mean Cell Hemoglobin Concentration : 33.3 gm/dL  Auto Neutrophil # : 8.97 K/uL  Auto Lymphocyte # : 1.89 K/uL  Auto Monocyte # : 0.68 K/uL  Auto Eosinophil # : 0.04 K/uL  Auto Basophil # : 0.03 K/uL  Auto Neutrophil % : 77.0 %  Auto Lymphocyte % : 16.2 %  Auto Monocyte % : 5.8 %  Auto Eosinophil % : 0.3 %  Auto Basophil % : 0.3 %      01-02    142  |  108  |  6<L>  ----------------------------<  84  3.7   |  25  |  0.74    Ca    8.3<L>      02 Jan 2021 05:48  Phos  4.2     01-02  Mg     1.6     01-02    TPro  7.6  /  Alb  3.7  /  TBili  0.6  /  DBili  x   /  AST  15  /  ALT  34  /  AlkPhos  117  01-02     PT/INR - ( 02 Jan 2021 05:48 )   PT: 13.5 sec;   INR: 1.14 ratio         Occult Blood, Feces (01.01.21 @ 15:30)   Occult Blood, Feces: Negative       RADIOLOGY/IMAGING                EXAM:  CT ANGIO ABD PELV (W)AW IC                            PROCEDURE DATE:  01/01/2021          INTERPRETATION:  CLINICAL INFORMATION: Upper GI bleed, coffee-ground emesis, EtOH abuse.    PROCEDURE: CT Angiography of the abdomen and pelvis.  Helical axial images were obtained from the domes of the diaphragm through the pubic symphysis without and with intravenous contrast. 90 mls of Omnipaque-350 administered without complication. 10 ml(s) discarded. Coronal and sagittal reformats and MIP were also obtained.    COMPARISON: None.    FINDINGS: Artifact from the patient's arms degrades images.    LOWER CHEST: Small right and trace left pleural effusion. Dependent opacities in the lower lobes > right middle lobe and lingula.    LIVER: Degraded by artifact. Grossly unremarkable.  GALLBLADDER/BILE DUCTS: No intrahepatic or extrahepatic biliary dilatation. Trace pericholecystic fluid.  PANCREAS: Unremarkable.  SPLEEN: Degraded by artifact. Grossly unremarkable.    ADRENALS: Unremarkable.  KIDNEYS/URETERS: No hydronephrosis, hydroureter or significant perinephric stranding. No radiopaque urinary tract stone.  BLADDER: Partially distended.  REPRODUCTIVE ORGANS: Unremarkable.    BOWEL: No bowel obstruction. Unremarkable appendix. Prominent wall of the visualized distal esophagus and stomach.  PERITONEUM: No drainable fluid collection or free air.  VESSELS: Normal caliber of the abdominal aorta.  RETROPERITONEUM: No lymphadenopathy.  ABDOMINAL WALL/SOFT TISSUES: Small fat-containing umbilical hernia.  BONES: Degenerative changes of the spine. Chronic mild anterior wedging of the thoracolumbar junction.    IMPRESSION:    No obvious contrast extravasation into the GI lumen to suggest active bleed. Prominent wall of the visualized distal esophagus and stomach, which may be due to partial distention and/or gastritis/esophagitis. Recommend correlation with endoscopy.    Nonspecific trace pericholecystic fluid. If clinically indicated, additional imaging may be obtained for further evaluation.    Bibasilar opacities, which may represent atelectasis. Recommend clinical correlation to assess pneumonia.

## 2021-01-02 NOTE — PROGRESS NOTE ADULT - PROBLEM SELECTOR PLAN 3
Had multiple episodes of coffee ground emesis  Likely secondary to alcoholism, gastritis vs varices vs ulcer  NPO for now  Was started on octreotide in ED  H/H is stable  Will repeat CBC, if stable can do cbc q12, and daily thereafter   GI Consult noted and appreciated   Aspiration precautions  f/u CTA abdomen

## 2021-01-02 NOTE — CONSULT NOTE ADULT - RS GEN PE MLT RESP DETAILS PC
airway patent/breath sounds equal/good air movement/respirations non-labored/clear to auscultation bilaterally/no rhonchi/no wheezes

## 2021-01-03 LAB
ALBUMIN SERPL ELPH-MCNC: 3.5 G/DL — SIGNIFICANT CHANGE UP (ref 3.5–5)
ALP SERPL-CCNC: 126 U/L — HIGH (ref 40–120)
ALT FLD-CCNC: 31 U/L DA — SIGNIFICANT CHANGE UP (ref 10–60)
ANION GAP SERPL CALC-SCNC: 9 MMOL/L — SIGNIFICANT CHANGE UP (ref 5–17)
AST SERPL-CCNC: 14 U/L — SIGNIFICANT CHANGE UP (ref 10–40)
BASOPHILS # BLD AUTO: 0.06 K/UL — SIGNIFICANT CHANGE UP (ref 0–0.2)
BASOPHILS NFR BLD AUTO: 0.9 % — SIGNIFICANT CHANGE UP (ref 0–2)
BILIRUB SERPL-MCNC: 2.2 MG/DL — HIGH (ref 0.2–1.2)
BUN SERPL-MCNC: 9 MG/DL — SIGNIFICANT CHANGE UP (ref 7–18)
CALCIUM SERPL-MCNC: 9.5 MG/DL — SIGNIFICANT CHANGE UP (ref 8.4–10.5)
CHLORIDE SERPL-SCNC: 106 MMOL/L — SIGNIFICANT CHANGE UP (ref 96–108)
CO2 SERPL-SCNC: 25 MMOL/L — SIGNIFICANT CHANGE UP (ref 22–31)
CREAT SERPL-MCNC: 0.74 MG/DL — SIGNIFICANT CHANGE UP (ref 0.5–1.3)
EOSINOPHIL # BLD AUTO: 0.54 K/UL — HIGH (ref 0–0.5)
EOSINOPHIL NFR BLD AUTO: 8 % — HIGH (ref 0–6)
GLUCOSE BLDC GLUCOMTR-MCNC: 111 MG/DL — HIGH (ref 70–99)
GLUCOSE SERPL-MCNC: 89 MG/DL — SIGNIFICANT CHANGE UP (ref 70–99)
HCT VFR BLD CALC: 44.3 % — SIGNIFICANT CHANGE UP (ref 39–50)
HGB BLD-MCNC: 14.6 G/DL — SIGNIFICANT CHANGE UP (ref 13–17)
IMM GRANULOCYTES NFR BLD AUTO: 0.4 % — SIGNIFICANT CHANGE UP (ref 0–1.5)
LYMPHOCYTES # BLD AUTO: 1.41 K/UL — SIGNIFICANT CHANGE UP (ref 1–3.3)
LYMPHOCYTES # BLD AUTO: 21 % — SIGNIFICANT CHANGE UP (ref 13–44)
MAGNESIUM SERPL-MCNC: 1.9 MG/DL — SIGNIFICANT CHANGE UP (ref 1.6–2.6)
MCHC RBC-ENTMCNC: 28.8 PG — SIGNIFICANT CHANGE UP (ref 27–34)
MCHC RBC-ENTMCNC: 33 GM/DL — SIGNIFICANT CHANGE UP (ref 32–36)
MCV RBC AUTO: 87.4 FL — SIGNIFICANT CHANGE UP (ref 80–100)
MONOCYTES # BLD AUTO: 0.58 K/UL — SIGNIFICANT CHANGE UP (ref 0–0.9)
MONOCYTES NFR BLD AUTO: 8.6 % — SIGNIFICANT CHANGE UP (ref 2–14)
NEUTROPHILS # BLD AUTO: 4.11 K/UL — SIGNIFICANT CHANGE UP (ref 1.8–7.4)
NEUTROPHILS NFR BLD AUTO: 61.1 % — SIGNIFICANT CHANGE UP (ref 43–77)
NRBC # BLD: 0 /100 WBCS — SIGNIFICANT CHANGE UP (ref 0–0)
PHOSPHATE SERPL-MCNC: 4.1 MG/DL — SIGNIFICANT CHANGE UP (ref 2.5–4.5)
PLATELET # BLD AUTO: 304 K/UL — SIGNIFICANT CHANGE UP (ref 150–400)
POTASSIUM SERPL-MCNC: 3.8 MMOL/L — SIGNIFICANT CHANGE UP (ref 3.5–5.3)
POTASSIUM SERPL-SCNC: 3.8 MMOL/L — SIGNIFICANT CHANGE UP (ref 3.5–5.3)
PROT SERPL-MCNC: 7.7 G/DL — SIGNIFICANT CHANGE UP (ref 6–8.3)
RBC # BLD: 5.07 M/UL — SIGNIFICANT CHANGE UP (ref 4.2–5.8)
RBC # FLD: 12.2 % — SIGNIFICANT CHANGE UP (ref 10.3–14.5)
SODIUM SERPL-SCNC: 140 MMOL/L — SIGNIFICANT CHANGE UP (ref 135–145)
WBC # BLD: 6.73 K/UL — SIGNIFICANT CHANGE UP (ref 3.8–10.5)
WBC # FLD AUTO: 6.73 K/UL — SIGNIFICANT CHANGE UP (ref 3.8–10.5)

## 2021-01-03 RX ADMIN — OCTREOTIDE ACETATE 10 MICROGRAM(S)/HR: 200 INJECTION, SOLUTION INTRAVENOUS; SUBCUTANEOUS at 17:57

## 2021-01-03 RX ADMIN — Medication 105 MILLIGRAM(S): at 06:06

## 2021-01-03 RX ADMIN — Medication 2 MILLIGRAM(S): at 14:38

## 2021-01-03 RX ADMIN — Medication 105 MILLIGRAM(S): at 14:39

## 2021-01-03 RX ADMIN — PANTOPRAZOLE SODIUM 40 MILLIGRAM(S): 20 TABLET, DELAYED RELEASE ORAL at 12:27

## 2021-01-03 RX ADMIN — Medication 2 MILLIGRAM(S): at 10:00

## 2021-01-03 RX ADMIN — Medication 2 MILLIGRAM(S): at 02:13

## 2021-01-03 RX ADMIN — Medication 2 MILLIGRAM(S): at 06:06

## 2021-01-03 RX ADMIN — Medication 105 MILLIGRAM(S): at 22:31

## 2021-01-03 RX ADMIN — Medication 2 MILLIGRAM(S): at 22:32

## 2021-01-03 NOTE — PROGRESS NOTE ADULT - PROBLEM SELECTOR PLAN 3
Had multiple episodes of coffee ground emesis  Likely secondary to alcoholism, gastritis vs varices vs ulcer  NPO for now  Was started on octreotide in ED  H/H is stable  Will repeat CBC, if stable can do cbc q12, and daily thereafter   GI Consult noted and appreciated   Aspiration precautions  f/u CTA abdomen Had multiple episodes of coffee ground emesis  Likely secondary to alcoholism, gastritis vs varices vs ulcer  H/H is stable  GI Consult noted and appreciated   Aspiration precautions   CTA abdomen no active bleeding   started on diet

## 2021-01-03 NOTE — PROGRESS NOTE ADULT - ATTENDING COMMENTS
IV Ativan taper now to 2mg q8, tolerating well; no DT symptomology.  Tolerating regular diet. H/H stable.  Possible d/c planning in a.m.

## 2021-01-03 NOTE — PROGRESS NOTE ADULT - ASSESSMENT
A young male with unknown past medical history comes in with AMS secondary to alcoholism.    ED:  Blood alcohol level- 578  Ct head- negative  Had multiple episodes of coffee ground emesis.  EKG showed sinus tachycardia    Patient is being admitted to medicine for alcohol intoxication and upper GI bleed.
A young male with unknown past medical history comes in with AMS secondary to alcoholism.    ED:  Blood alcohol level- 578  Ct head- negative  Had multiple episodes of coffee ground emesis.  EKG showed sinus tachycardia    Patient is being admitted to medicine for alcohol intoxication and upper GI bleed.

## 2021-01-03 NOTE — DISCHARGE NOTE PROVIDER - HOSPITAL COURSE
21 YO  male with unknown past medical history, was BIB EMS due to AMS. He was found unresponsive outside the building with vomitus all over. Patient was unresponsive when he was bought in to ED.   ED course:  He later was awake and had 4 episodes of coffee ground emesis in ED. His blood alcohol level was >500. He gave minimal history. Blood alcohol level was  578  Ct head was done and was  negative for any acute event.He had  multiple episodes of coffee ground emesis. EKG showed sinus tachycardia  Patient is being admitted to medicine for alcohol intoxication and upper GI bleed.    for Alcohol withdrawal. Patient was started on  IV supplements (thiamine + MV) and started on  CIWA protocol. Ativan was given and tapered.  was consulted for alcohol abuse.     for Aspiration pneumonia, Patient Presented with vomiting, CXR showed infiltrates. Patient was started on levaquin for 3 days.       For  Coffee ground emesis, Patient Had multiple episodes of coffee ground emesis, Likely secondary to alcoholism, gastritis vs varices vs ulcer. Haemoglobin was closely monitored and was stable. GI was consulted  Consult noted and patient was started on protonix . CTA abdomen showed  no active bleeding .

## 2021-01-03 NOTE — PROGRESS NOTE ADULT - PROBLEM SELECTOR PLAN 5
On SCD for dvt prophylaxis  On PPi for Gi prophylaxis
On SCD for dvt prophylaxis  On PPi for Gi prophylaxis

## 2021-01-03 NOTE — DISCHARGE NOTE PROVIDER - NSDCCPCAREPLAN_GEN_ALL_CORE_FT
PRINCIPAL DISCHARGE DIAGNOSIS  Diagnosis: Alcohol intoxication  Assessment and Plan of Treatment: You were started on  IV supplements (thiamine + MV) and started on  CIWA protocol. Ativan was given and tapered.  was consulted for alcohol abuse.      SECONDARY DISCHARGE DIAGNOSES  Diagnosis: Aspiration pneumonia  Assessment and Plan of Treatment: You presented  with vomiting, CXR showed infiltrates. You were started on levaquin for 3 days.    Diagnosis: Coffee ground emesis  Assessment and Plan of Treatment: You had multiple episodes of coffee ground emesis, Likely secondary to alcoholism, gastritis vs varices vs ulcer. Haemoglobin was closely monitored and was stable. GI was consulted  Consult noted and You were   started on protonix . CTA abdomen showed  no active bleeding .       PRINCIPAL DISCHARGE DIAGNOSIS  Diagnosis: Alcohol intoxication  Assessment and Plan of Treatment: You were started on  IV supplements (thiamine + MV) and started on  CIWA protocol. Ativan was given and tapered.  was consulted for alcohol abuse.Your condition improved. You are recommended to discontinue alcohol intake.      SECONDARY DISCHARGE DIAGNOSES  Diagnosis: Aspiration pneumonia  Assessment and Plan of Treatment: You presented  with vomiting, CXR showed infiltrates. You finished a full course of levaquin for 3 days.    Diagnosis: Coffee ground emesis  Assessment and Plan of Treatment: You had multiple episodes of coffee ground emesis, Likely secondary to alcoholism, gastritis vs varices vs ulcer. Haemoglobin was closely monitored and was stable. GI was consulted   and You were   started on protonix . CTA abdomen showed  no active bleeding .You are revcommended to continue on protonix and follow up with gastroenterologist after discharge.

## 2021-01-03 NOTE — PROGRESS NOTE ADULT - PROBLEM SELECTOR PLAN 1
Presented with encephalopathy secondary to alcohol intoxication  Blood alcohol level 578  Also had multiple episodes of vomiting containing coffee ground material  NPO for now  Cont on IV supplements (thiamine + MV)  Started on CIWA protocol  Ativan 2q2 standing --> change to 2mg q4 (cont CIWA as backup)  f/u utox  Monitor CIWA  aspiration precautions  Seizure precautions  Social consult Presented with encephalopathy secondary to alcohol intoxication  Blood alcohol level 578  Also had multiple episodes of vomiting containing coffee ground material  Cont on IV supplements (thiamine + MV)  Started on CIWA protocol  Ativan 2q4 standing --> change to 2mg q8 (cont CIWA as backup)  f/u utox  Monitor CIWA  aspiration precautions  Seizure precautions  Social consult

## 2021-01-03 NOTE — PROGRESS NOTE ADULT - PROBLEM SELECTOR PLAN 4
Had 2 episodes of watery diarrhea which were foul smelling  If has more episodes will send for cdiff and other stool studies  IV hydration for now Had 2 episodes of watery diarrhea which were foul smelling  resolved

## 2021-01-03 NOTE — DISCHARGE NOTE PROVIDER - CARE PROVIDER_API CALL
Manuel Perez)  Medicine  [address] [place] [address], [address] [place] [address]  Phone: [phone]  Fax: [phone]  Follow Up Time:

## 2021-01-03 NOTE — PROGRESS NOTE ADULT - PROBLEM SELECTOR PLAN 2
Presented with vomiting  CXR showed ?infiltrates  Was given one dose of zosyn  Will start on levaquin  D/c antibiotics if patient does not develop fever or stop after 3 days  Aspiration precautions Presented with vomiting  CXR showed ?infiltrates  Was given one dose of zosyn   on levaquin for 3 days  Aspiration precautions

## 2021-01-03 NOTE — PROGRESS NOTE ADULT - SUBJECTIVE AND OBJECTIVE BOX
PGY-1 Progress Note discussed with attending    PAGER #: [[phone]] TILL 5:00 PM  PLEASE CONTACT ON CALL TEAM:  - On Call Team (Please refer to Kassidy) FROM 5:00 PM - 8:30PM  - Nightfloat Team FROM 8:30 -7:30 AM    CHIEF COMPLAINT & BRIEF HOSPITAL COURSE:    INTERVAL HPI/OVERNIGHT EVENTS:       REVIEW OF SYSTEMS:  CONSTITUTIONAL: No fever, weight loss, or fatigue  RESPIRATORY: No cough, wheezing, chills or hemoptysis; No shortness of breath  CARDIOVASCULAR: No chest pain, palpitations, dizziness, or leg swelling  GASTROINTESTINAL: No abdominal pain. No nausea, vomiting, or hematemesis; No diarrhea or constipation. No melena or hematochezia.  GENITOURINARY: No dysuria or hematuria, urinary frequency  NEUROLOGICAL: No headaches, memory loss, loss of strength, numbness, or tremors  SKIN: No itching, burning, rashes, or lesions     MEDICATIONS  (STANDING):  dextrose 5% + sodium chloride 0.9%. 1000 milliLiter(s) (100 mL/Hr) IV Continuous <Continuous>  influenza   Vaccine 0.5 milliLiter(s) IntraMuscular once  levoFLOXacin IVPB 500 milliGRAM(s) IV Intermittent every 24 hours  LORazepam   Injectable 2 milliGRAM(s) IV Push every 4 hours  octreotide  Infusion 50 MICROgram(s)/Hr (10 mL/Hr) IV Continuous <Continuous>  pantoprazole  Injectable 40 milliGRAM(s) IV Push daily  thiamine IVPB 500 milliGRAM(s) IV Intermittent every 8 hours    MEDICATIONS  (PRN):  LORazepam   Injectable 2 milliGRAM(s) IV Push every 2 hours PRN Agitation      Vital Signs Last 24 Hrs  T(C): 36.6 (03 Jan 2021 08:33), Max: 37.6 (02 Jan 2021 20:05)  T(F): 97.8 (03 Jan 2021 08:33), Max: 99.6 (02 Jan 2021 20:05)  HR: 90 (03 Jan 2021 08:33) (86 - 113)  BP: 127/69 (03 Jan 2021 08:33) (124/72 - 141/74)  BP(mean): --  RR: 18 (03 Jan 2021 08:33) (18 - 18)  SpO2: 98% (03 Jan 2021 08:33) (96% - 100%)    PHYSICAL EXAMINATION:  GENERAL: NAD, well built  HEAD:  Atraumatic, Normocephalic  EYES:  conjunctiva and sclera clear  NECK: Supple, No JVD, Normal thyroid  CHEST/LUNG: Clear to auscultation. Clear to percussion bilaterally; No rales, rhonchi, wheezing, or rubs  HEART: Regular rate and rhythm; No murmurs, rubs, or gallops  ABDOMEN: Soft, Nontender, Nondistended; Bowel sounds present  NERVOUS SYSTEM:  Alert & Oriented X3,    EXTREMITIES:  2+ Peripheral Pulses, No clubbing, cyanosis, or edema  SKIN: warm dry                          14.6   6.73  )-----------( 304      ( 03 Jan 2021 07:01 )             44.3     01-03    140  |  106  |  9   ----------------------------<  89  3.8   |  25  |  0.74    Ca    9.5      03 Jan 2021 07:01  Phos  4.1     01-03  Mg     1.9     01-03    TPro  7.7  /  Alb  3.5  /  TBili  2.2<H>  /  DBili  x   /  AST  14  /  ALT  31  /  AlkPhos  126<H>  01-03    LIVER FUNCTIONS - ( 03 Jan 2021 07:01 )  Alb: 3.5 g/dL / Pro: 7.7 g/dL / ALK PHOS: 126 U/L / ALT: 31 U/L DA / AST: 14 U/L / GGT: x               PT/INR - ( 02 Jan 2021 05:48 )   PT: 13.5 sec;   INR: 1.14 ratio                 CAPILLARY BLOOD GLUCOSE  CAPILLARY BLOOD GLUCOSE      POCT Blood Glucose.: 116 mg/dL (02 Jan 2021 21:15)    CAPILLARY BLOOD GLUCOSE      POCT Blood Glucose.: 116 mg/dL (02 Jan 2021 21:15)  POCT Blood Glucose.: 137 mg/dL (02 Jan 2021 11:52)      RADIOLOGY & ADDITIONAL TESTS:                   PGY-1 Progress Note discussed with attending    PAGER #: [[phone]] TILL 5:00 PM  PLEASE CONTACT ON CALL TEAM:  - On Call Team (Please refer to Kassidy) FROM 5:00 PM - 8:30PM  - Nightfloat Team FROM 8:30 -7:30 AM        INTERVAL HPI/OVERNIGHT EVENTS:       REVIEW OF SYSTEMS:  CONSTITUTIONAL: No fever, weight loss, or fatigue  RESPIRATORY: No cough, wheezing, chills or hemoptysis; No shortness of breath  CARDIOVASCULAR: No chest pain, palpitations, dizziness, or leg swelling  GASTROINTESTINAL: No abdominal pain. No nausea, vomiting, or hematemesis; No diarrhea or constipation. No melena or hematochezia.  GENITOURINARY: No dysuria or hematuria, urinary frequency  NEUROLOGICAL: No headaches, memory loss, loss of strength, numbness, or tremors  SKIN: No itching, burning, rashes, or lesions     MEDICATIONS  (STANDING):  dextrose 5% + sodium chloride 0.9%. 1000 milliLiter(s) (100 mL/Hr) IV Continuous <Continuous>  influenza   Vaccine 0.5 milliLiter(s) IntraMuscular once  levoFLOXacin IVPB 500 milliGRAM(s) IV Intermittent every 24 hours  LORazepam   Injectable 2 milliGRAM(s) IV Push every 4 hours  octreotide  Infusion 50 MICROgram(s)/Hr (10 mL/Hr) IV Continuous <Continuous>  pantoprazole  Injectable 40 milliGRAM(s) IV Push daily  thiamine IVPB 500 milliGRAM(s) IV Intermittent every 8 hours    MEDICATIONS  (PRN):  LORazepam   Injectable 2 milliGRAM(s) IV Push every 2 hours PRN Agitation      Vital Signs Last 24 Hrs  T(C): 36.6 (03 Jan 2021 08:33), Max: 37.6 (02 Jan 2021 20:05)  T(F): 97.8 (03 Jan 2021 08:33), Max: 99.6 (02 Jan 2021 20:05)  HR: 90 (03 Jan 2021 08:33) (86 - 113)  BP: 127/69 (03 Jan 2021 08:33) (124/72 - 141/74)  BP(mean): --  RR: 18 (03 Jan 2021 08:33) (18 - 18)  SpO2: 98% (03 Jan 2021 08:33) (96% - 100%)    PHYSICAL EXAMINATION:  GENERAL: NAD, well built  HEAD:  Atraumatic, Normocephalic  EYES:  conjunctiva and sclera clear  NECK: Supple, No JVD, Normal thyroid  CHEST/LUNG: Clear to auscultation. Clear to percussion bilaterally; No rales, rhonchi, wheezing, or rubs  HEART: Regular rate and rhythm; No murmurs, rubs, or gallops  ABDOMEN: Soft, Nontender, Nondistended; Bowel sounds present  NERVOUS SYSTEM:  Alert & Oriented X3,    EXTREMITIES:  2+ Peripheral Pulses, No clubbing, cyanosis, or edema  SKIN: warm dry                          14.6   6.73  )-----------( 304      ( 03 Jan 2021 07:01 )             44.3     01-03    140  |  106  |  9   ----------------------------<  89  3.8   |  25  |  0.74    Ca    9.5      03 Jan 2021 07:01  Phos  4.1     01-03  Mg     1.9     01-03    TPro  7.7  /  Alb  3.5  /  TBili  2.2<H>  /  DBili  x   /  AST  14  /  ALT  31  /  AlkPhos  126<H>  01-03    LIVER FUNCTIONS - ( 03 Jan 2021 07:01 )  Alb: 3.5 g/dL / Pro: 7.7 g/dL / ALK PHOS: 126 U/L / ALT: 31 U/L DA / AST: 14 U/L / GGT: x               PT/INR - ( 02 Jan 2021 05:48 )   PT: 13.5 sec;   INR: 1.14 ratio                 CAPILLARY BLOOD GLUCOSE  CAPILLARY BLOOD GLUCOSE      POCT Blood Glucose.: 116 mg/dL (02 Jan 2021 21:15)    CAPILLARY BLOOD GLUCOSE      POCT Blood Glucose.: 116 mg/dL (02 Jan 2021 21:15)  POCT Blood Glucose.: 137 mg/dL (02 Jan 2021 11:52)      RADIOLOGY & ADDITIONAL TESTS:                   PGY-1 Progress Note discussed with attending    PAGER #: [[phone]] TILL 5:00 PM  PLEASE CONTACT ON CALL TEAM:  - On Call Team (Please refer to Kassidy) FROM 5:00 PM - 8:30PM  - Nightfloat Team FROM 8:30 -7:30 AM        INTERVAL HPI/OVERNIGHT EVENTS:   patient examined bedside, he is comfortable in bed , tremors improved , he is CIWA 1 . will continue on CIWA protocol , didnot receive any PRN ativan since yesterday.   will taper ativan standing to q8 and start him on diet    REVIEW OF SYSTEMS:  CONSTITUTIONAL: No fever, weight loss, or fatigue  RESPIRATORY: No cough, wheezing, chills or hemoptysis; No shortness of breath  CARDIOVASCULAR: No chest pain, palpitations, dizziness, or leg swelling  GASTROINTESTINAL: No abdominal pain. No nausea, vomiting, or hematemesis; No diarrhea or constipation. No melena or hematochezia.  GENITOURINARY: No dysuria or hematuria, urinary frequency  NEUROLOGICAL: No headaches, memory loss, loss of strength, numbness, or tremors  SKIN: No itching, burning, rashes, or lesions     MEDICATIONS  (STANDING):  dextrose 5% + sodium chloride 0.9%. 1000 milliLiter(s) (100 mL/Hr) IV Continuous <Continuous>  influenza   Vaccine 0.5 milliLiter(s) IntraMuscular once  levoFLOXacin IVPB 500 milliGRAM(s) IV Intermittent every 24 hours  LORazepam   Injectable 2 milliGRAM(s) IV Push every 4 hours  octreotide  Infusion 50 MICROgram(s)/Hr (10 mL/Hr) IV Continuous <Continuous>  pantoprazole  Injectable 40 milliGRAM(s) IV Push daily  thiamine IVPB 500 milliGRAM(s) IV Intermittent every 8 hours    MEDICATIONS  (PRN):  LORazepam   Injectable 2 milliGRAM(s) IV Push every 2 hours PRN Agitation      Vital Signs Last 24 Hrs  T(C): 36.6 (03 Jan 2021 08:33), Max: 37.6 (02 Jan 2021 20:05)  T(F): 97.8 (03 Jan 2021 08:33), Max: 99.6 (02 Jan 2021 20:05)  HR: 90 (03 Jan 2021 08:33) (86 - 113)  BP: 127/69 (03 Jan 2021 08:33) (124/72 - 141/74)  BP(mean): --  RR: 18 (03 Jan 2021 08:33) (18 - 18)  SpO2: 98% (03 Jan 2021 08:33) (96% - 100%)    PHYSICAL EXAMINATION:  GENERAL: NAD, well built  HEAD:  Atraumatic, Normocephalic  EYES:  conjunctiva and sclera clear  NECK: Supple, No JVD, Normal thyroid  CHEST/LUNG: Clear to auscultation. Clear to percussion bilaterally; No rales, rhonchi, wheezing, or rubs  HEART: Regular rate and rhythm; No murmurs, rubs, or gallops  ABDOMEN: Soft, Nontender, Nondistended; Bowel sounds present  NERVOUS SYSTEM:  Alert & Oriented X3,  mild tremors  EXTREMITIES:  2+ Peripheral Pulses, No clubbing, cyanosis, or edema  SKIN: warm dry                          14.6   6.73  )-----------( 304      ( 03 Jan 2021 07:01 )             44.3     01-03    140  |  106  |  9   ----------------------------<  89  3.8   |  25  |  0.74    Ca    9.5      03 Jan 2021 07:01  Phos  4.1     01-03  Mg     1.9     01-03    TPro  7.7  /  Alb  3.5  /  TBili  2.2<H>  /  DBili  x   /  AST  14  /  ALT  31  /  AlkPhos  126<H>  01-03    LIVER FUNCTIONS - ( 03 Jan 2021 07:01 )  Alb: 3.5 g/dL / Pro: 7.7 g/dL / ALK PHOS: 126 U/L / ALT: 31 U/L DA / AST: 14 U/L / GGT: x               PT/INR - ( 02 Jan 2021 05:48 )   PT: 13.5 sec;   INR: 1.14 ratio                 CAPILLARY BLOOD GLUCOSE  CAPILLARY BLOOD GLUCOSE      POCT Blood Glucose.: 116 mg/dL (02 Jan 2021 21:15)    CAPILLARY BLOOD GLUCOSE      POCT Blood Glucose.: 116 mg/dL (02 Jan 2021 21:15)  POCT Blood Glucose.: 137 mg/dL (02 Jan 2021 11:52)      RADIOLOGY & ADDITIONAL TESTS:

## 2021-01-04 VITALS
SYSTOLIC BLOOD PRESSURE: 105 MMHG | RESPIRATION RATE: 18 BRPM | OXYGEN SATURATION: 95 % | TEMPERATURE: 99 F | HEART RATE: 77 BPM | DIASTOLIC BLOOD PRESSURE: 66 MMHG

## 2021-01-04 LAB
ALBUMIN SERPL ELPH-MCNC: 3.6 G/DL — SIGNIFICANT CHANGE UP (ref 3.5–5)
ALP SERPL-CCNC: 126 U/L — HIGH (ref 40–120)
ALT FLD-CCNC: 26 U/L DA — SIGNIFICANT CHANGE UP (ref 10–60)
ANION GAP SERPL CALC-SCNC: 9 MMOL/L — SIGNIFICANT CHANGE UP (ref 5–17)
AST SERPL-CCNC: 16 U/L — SIGNIFICANT CHANGE UP (ref 10–40)
BASOPHILS # BLD AUTO: 0.05 K/UL — SIGNIFICANT CHANGE UP (ref 0–0.2)
BASOPHILS NFR BLD AUTO: 0.7 % — SIGNIFICANT CHANGE UP (ref 0–2)
BILIRUB SERPL-MCNC: 2.4 MG/DL — HIGH (ref 0.2–1.2)
BUN SERPL-MCNC: 12 MG/DL — SIGNIFICANT CHANGE UP (ref 7–18)
CALCIUM SERPL-MCNC: 9.5 MG/DL — SIGNIFICANT CHANGE UP (ref 8.4–10.5)
CHLORIDE SERPL-SCNC: 103 MMOL/L — SIGNIFICANT CHANGE UP (ref 96–108)
CO2 SERPL-SCNC: 26 MMOL/L — SIGNIFICANT CHANGE UP (ref 22–31)
CREAT SERPL-MCNC: 0.95 MG/DL — SIGNIFICANT CHANGE UP (ref 0.5–1.3)
EOSINOPHIL # BLD AUTO: 0.66 K/UL — HIGH (ref 0–0.5)
EOSINOPHIL NFR BLD AUTO: 9.1 % — HIGH (ref 0–6)
GLUCOSE BLDC GLUCOMTR-MCNC: 105 MG/DL — HIGH (ref 70–99)
GLUCOSE BLDC GLUCOMTR-MCNC: 116 MG/DL — HIGH (ref 70–99)
GLUCOSE BLDC GLUCOMTR-MCNC: 116 MG/DL — HIGH (ref 70–99)
GLUCOSE SERPL-MCNC: 101 MG/DL — HIGH (ref 70–99)
HCT VFR BLD CALC: 44.4 % — SIGNIFICANT CHANGE UP (ref 39–50)
HGB BLD-MCNC: 14.6 G/DL — SIGNIFICANT CHANGE UP (ref 13–17)
IMM GRANULOCYTES NFR BLD AUTO: 0.4 % — SIGNIFICANT CHANGE UP (ref 0–1.5)
LYMPHOCYTES # BLD AUTO: 1.36 K/UL — SIGNIFICANT CHANGE UP (ref 1–3.3)
LYMPHOCYTES # BLD AUTO: 18.8 % — SIGNIFICANT CHANGE UP (ref 13–44)
MAGNESIUM SERPL-MCNC: 2.1 MG/DL — SIGNIFICANT CHANGE UP (ref 1.6–2.6)
MCHC RBC-ENTMCNC: 28.9 PG — SIGNIFICANT CHANGE UP (ref 27–34)
MCHC RBC-ENTMCNC: 32.9 GM/DL — SIGNIFICANT CHANGE UP (ref 32–36)
MCV RBC AUTO: 87.7 FL — SIGNIFICANT CHANGE UP (ref 80–100)
MONOCYTES # BLD AUTO: 0.56 K/UL — SIGNIFICANT CHANGE UP (ref 0–0.9)
MONOCYTES NFR BLD AUTO: 7.7 % — SIGNIFICANT CHANGE UP (ref 2–14)
NEUTROPHILS # BLD AUTO: 4.59 K/UL — SIGNIFICANT CHANGE UP (ref 1.8–7.4)
NEUTROPHILS NFR BLD AUTO: 63.3 % — SIGNIFICANT CHANGE UP (ref 43–77)
NRBC # BLD: 0 /100 WBCS — SIGNIFICANT CHANGE UP (ref 0–0)
PHOSPHATE SERPL-MCNC: 4.3 MG/DL — SIGNIFICANT CHANGE UP (ref 2.5–4.5)
PLATELET # BLD AUTO: 301 K/UL — SIGNIFICANT CHANGE UP (ref 150–400)
POTASSIUM SERPL-MCNC: 3.9 MMOL/L — SIGNIFICANT CHANGE UP (ref 3.5–5.3)
POTASSIUM SERPL-SCNC: 3.9 MMOL/L — SIGNIFICANT CHANGE UP (ref 3.5–5.3)
PROT SERPL-MCNC: 8.3 G/DL — SIGNIFICANT CHANGE UP (ref 6–8.3)
RBC # BLD: 5.06 M/UL — SIGNIFICANT CHANGE UP (ref 4.2–5.8)
RBC # FLD: 12.1 % — SIGNIFICANT CHANGE UP (ref 10.3–14.5)
SODIUM SERPL-SCNC: 138 MMOL/L — SIGNIFICANT CHANGE UP (ref 135–145)
WBC # BLD: 7.25 K/UL — SIGNIFICANT CHANGE UP (ref 3.8–10.5)
WBC # FLD AUTO: 7.25 K/UL — SIGNIFICANT CHANGE UP (ref 3.8–10.5)

## 2021-01-04 PROCEDURE — U0005: CPT

## 2021-01-04 PROCEDURE — 86901 BLOOD TYPING SEROLOGIC RH(D): CPT

## 2021-01-04 PROCEDURE — 82803 BLOOD GASES ANY COMBINATION: CPT

## 2021-01-04 PROCEDURE — 84100 ASSAY OF PHOSPHORUS: CPT

## 2021-01-04 PROCEDURE — 82009 KETONE BODYS QUAL: CPT

## 2021-01-04 PROCEDURE — 82962 GLUCOSE BLOOD TEST: CPT

## 2021-01-04 PROCEDURE — 83036 HEMOGLOBIN GLYCOSYLATED A1C: CPT

## 2021-01-04 PROCEDURE — 85027 COMPLETE CBC AUTOMATED: CPT

## 2021-01-04 PROCEDURE — 74174 CTA ABD&PLVS W/CONTRAST: CPT

## 2021-01-04 PROCEDURE — 82607 VITAMIN B-12: CPT

## 2021-01-04 PROCEDURE — 80053 COMPREHEN METABOLIC PANEL: CPT

## 2021-01-04 PROCEDURE — 82746 ASSAY OF FOLIC ACID SERUM: CPT

## 2021-01-04 PROCEDURE — 70450 CT HEAD/BRAIN W/O DYE: CPT

## 2021-01-04 PROCEDURE — 96374 THER/PROPH/DIAG INJ IV PUSH: CPT

## 2021-01-04 PROCEDURE — 85025 COMPLETE CBC W/AUTO DIFF WBC: CPT

## 2021-01-04 PROCEDURE — 83605 ASSAY OF LACTIC ACID: CPT

## 2021-01-04 PROCEDURE — 96375 TX/PRO/DX INJ NEW DRUG ADDON: CPT

## 2021-01-04 PROCEDURE — 36415 COLL VENOUS BLD VENIPUNCTURE: CPT

## 2021-01-04 PROCEDURE — 71045 X-RAY EXAM CHEST 1 VIEW: CPT

## 2021-01-04 PROCEDURE — 87635 SARS-COV-2 COVID-19 AMP PRB: CPT

## 2021-01-04 PROCEDURE — 80076 HEPATIC FUNCTION PANEL: CPT

## 2021-01-04 PROCEDURE — 85610 PROTHROMBIN TIME: CPT

## 2021-01-04 PROCEDURE — 82272 OCCULT BLD FECES 1-3 TESTS: CPT

## 2021-01-04 PROCEDURE — 99291 CRITICAL CARE FIRST HOUR: CPT

## 2021-01-04 PROCEDURE — 83735 ASSAY OF MAGNESIUM: CPT

## 2021-01-04 PROCEDURE — 80061 LIPID PANEL: CPT

## 2021-01-04 PROCEDURE — 87040 BLOOD CULTURE FOR BACTERIA: CPT

## 2021-01-04 PROCEDURE — 83690 ASSAY OF LIPASE: CPT

## 2021-01-04 PROCEDURE — 86900 BLOOD TYPING SEROLOGIC ABO: CPT

## 2021-01-04 PROCEDURE — 80307 DRUG TEST PRSMV CHEM ANLYZR: CPT

## 2021-01-04 PROCEDURE — 80048 BASIC METABOLIC PNL TOTAL CA: CPT

## 2021-01-04 PROCEDURE — 93005 ELECTROCARDIOGRAM TRACING: CPT

## 2021-01-04 PROCEDURE — 84443 ASSAY THYROID STIM HORMONE: CPT

## 2021-01-04 PROCEDURE — 86850 RBC ANTIBODY SCREEN: CPT

## 2021-01-04 RX ORDER — PANTOPRAZOLE SODIUM 20 MG/1
1 TABLET, DELAYED RELEASE ORAL
Qty: 30 | Refills: 0
Start: 2021-01-04 | End: 2021-02-02

## 2021-01-04 RX ADMIN — Medication 105 MILLIGRAM(S): at 05:17

## 2021-01-04 RX ADMIN — Medication 2 MILLIGRAM(S): at 05:18

## 2021-01-04 RX ADMIN — OCTREOTIDE ACETATE 10 MICROGRAM(S)/HR: 200 INJECTION, SOLUTION INTRAVENOUS; SUBCUTANEOUS at 08:36

## 2021-01-04 RX ADMIN — PANTOPRAZOLE SODIUM 40 MILLIGRAM(S): 20 TABLET, DELAYED RELEASE ORAL at 12:36

## 2021-01-04 NOTE — DISCHARGE NOTE NURSING/CASE MANAGEMENT/SOCIAL WORK - PATIENT PORTAL LINK FT
You can access the FollowMyHealth Patient Portal offered by [facility] by registering at the following website: [email or web address]. By joining [employer]’s FollowMyHealth portal, you will also be able to view your health information using other applications (apps) compatible with our system.

## 2021-01-06 LAB
CULTURE RESULTS: SIGNIFICANT CHANGE UP
CULTURE RESULTS: SIGNIFICANT CHANGE UP
SPECIMEN SOURCE: SIGNIFICANT CHANGE UP
SPECIMEN SOURCE: SIGNIFICANT CHANGE UP